# Patient Record
Sex: FEMALE | Race: WHITE
[De-identification: names, ages, dates, MRNs, and addresses within clinical notes are randomized per-mention and may not be internally consistent; named-entity substitution may affect disease eponyms.]

---

## 2018-02-10 NOTE — RADRPT
EXAM DATE/TIME:  02/10/2018 14:58 

 

[facility] COMPARISON:     

No previous studies available for comparison.

 

 

INDICATIONS :     

Headaches for two weeks.

                      

 

RADIATION DOSE:     

35.50 CTDIvol (mGy) 

 

 

 

MEDICAL HISTORY :     

None  

 

SURGICAL HISTORY :      

Tubal ligation. 

 

ENCOUNTER:      

Initial

 

ACUITY:      

2 weeks

 

PAIN SCALE:      

6/10

 

LOCATION:       

Bilateral cranial 

 

TECHNIQUE:     

Multiple contiguous axial images were obtained of the head.  Using automated exposure control and adj
ustment of the mA and/or kV according to patient size, radiation dose was kept as low as reasonably a
chievable to obtain optimal diagnostic quality images.   DICOM format image data is available electro
nically for review and comparison.  

 

FINDINGS:     

 

CEREBRUM:     

The ventricles are normal for age.  No evidence of midline shift, mass lesion, hemorrhage or acute in
farction.  No extra-axial fluid collections are seen.

 

POSTERIOR FOSSA:     

The cerebellum and brainstem are intact.  The 4th ventricle is midline.  The cerebellopontine angle i
s unremarkable.

 

EXTRACRANIAL:     

The visualized portion of the orbits is intact.

 

SKULL:     

The calvaria is intact.  No evidence of skull fracture.

 

CONCLUSION:     

Normal examination.  

 

 

 

 Roland Larios MD on February 10, 2018 at 15:05           

Board Certified Radiologist.

 This report was verified electronically.

## 2018-02-10 NOTE — PD
Data


Data


Last Documented VS





Vital Signs








  Date Time  Temp Pulse Resp B/P (MAP) Pulse Ox O2 Delivery O2 Flow Rate FiO2


 


2/10/18 16:12  87 20 120/70 (87) 98   


 


2/10/18 12:51 98.5     Room Air  








Orders





 Orders


Ct Brain W/O Iv Contrast(Rout) (2/10/18 )


Diphenhydramine (Benadryl) (2/10/18 13:45)


Prochlorperazine Inj (Compazine Inj) (2/10/18 13:45)


Ketorolac Inj (Toradol Inj) (2/10/18 16:00)








MDM


Supervised Visit with [facility]:  Yes


Narrative Course


The history, exam, and medical decision-making in the associated midlevel 

provider note were completed with my assistance. I reviewed and agree with the 

findings presented.  I attest that I had a face-to-face encounter with the 

patient on the same day, and personally performed and documented my assessment 

and findings in the medical record. 





*My assessment and Findings: This is a 37-year-old female who presents to the 

emergency department with headache that has been going on intermittently for 

over a week.  She has had headaches in the past and suffered with migraines 

when she was young.  This feels similar.  She has a normal gross neurologic 

exam.  Her headache started 6 hours prior to arrival in the ER and her CT scan 

is negative.  I doubt subarachnoid hemorrhage.  I think the patient can safely 

be discharged home.  She feels much better after a migraine cocktail.











Comfort Hernández MD Feb 10, 2018 16:13

## 2018-02-10 NOTE — PD
HPI


Chief Complaint:  Headache


Time Seen by Provider:  13:33


Travel History


International Travel<30 days:  No


Contact w/Intl Traveler<30days:  No


Traveled to known affect area:  No





History of Present Illness


HPI


37-year-old female presents emergency Department with complaint of a headache 

to the back of her head and neck that has been intermittent for the past 2 

weeks with worsening this morning at 7:30 AM.  Headache has been on and off and 

this morning it abruptly onset and has not been relieved with Goody powder.  

She went to work and developed cold sweats, nausea, blurred vision, 

lightheadedness and feeling like she was spinning.  Denies vomiting or fever.  

Denies focal deficits or weakness.  Denies confusion, disorientation, change in 

mentation, slurred speech.  Has history of migraines and last had a migraine 

headache 12 years ago.  Rates pain 6/10.  Describes it as a pressure.  Last 

menstrual period was last week and has history of tubal ligation.  Has tried 

Goody powder for symptom management.  No known relieving or aggravating 

factors.  No primary care provider.  Allergies to sulfa.  History of migraines, 

otherwise denies other significant past medical history.





PFSH


Past Medical History


Bipolar Disorder:  Yes


Tetanus Vaccination:  > 5 Years


Pregnant?:  Not Pregnant


LMP:  Tubal ligation





Social History


Alcohol Use:  No


Tobacco Use:  Yes


Substance Use:  No





Allergies-Medications


(Allergen,Severity, Reaction):  


Uncoded Allergies:  


     sulfa drugs (Allergy, Intermediate, 2/10/18)


Reported Meds & Prescriptions





Reported Meds & Active Scripts


Active


Reported


Geodon (Ziprasidone) 60 Mg Cap 60 Mg PO DAILY








Review of Systems


Except as stated in HPI:  all other systems reviewed are Neg





Physical Exam


Narrative


GENERAL: Well-nourished, well-developed  female patient, in no acute 

distress


SKIN: Warm and dry.


HEAD: Atraumatic. Normocephalic.  No facial droop noted.  Tongue midline.  

Finger to nose test normal.


EYES: Pupils equal and round at 4 mm with brisk reaction. No scleral icterus. 

No injection or drainage.  PERRLA.  EOMI.


ENT: Mucosa pink and moist.  Airway patent.


NECK: Trachea midline.  No lymphadenopathy.  


CARDIOVASCULAR: Regular rate and rhythm.  No murmur appreciated. 


RESPIRATORY: No accessory muscle use. Clear to auscultation. Breath sounds 

equal bilaterally. 


GASTROINTESTINAL: Abdomen soft, non-tender, nondistended. Hepatic and splenic 

margins not palpable.  Bowel sounds are active 4 quadrants.  


MUSCULOSKELETAL: No obvious deformities. No clubbing.  No cyanosis.  No edema. 


NEUROLOGICAL: Awake and alert.  Oriented 3.  No obvious cranial nerve 

deficits.  Motor grossly within normal limits. Normal speech. No ataxia.  No mid

-line drift.  No extremity or lower extremity drift.  Moves all extremities.  5/

5 strength to all extremities.


PSYCHIATRIC: Appropriate mood and affect; insight and judgment normal.





Data


Data


Last Documented VS





Vital Signs








  Date Time  Temp Pulse Resp B/P (MAP) Pulse Ox O2 Delivery O2 Flow Rate FiO2


 


2/10/18 16:12  87 20 120/70 (87) 98   


 


2/10/18 12:51 98.5     Room Air  








Orders





 Orders


Ct Brain W/O Iv Contrast(Rout) (2/10/18 )


Diphenhydramine (Benadryl) (2/10/18 13:45)


Prochlorperazine Inj (Compazine Inj) (2/10/18 13:45)


Ketorolac Inj (Toradol Inj) (2/10/18 16:00)








[facility]


Medical Decision Making


Medical Screen Exam Complete:  Yes


Emergency Medical Condition:  Yes


Medical Record Reviewed:  Yes


Differential Diagnosis


Cephalgia, migraine, subarachnoid hemorrhage, acute headache


Narrative Course


37-year-old female with posterior headache that abruptly onset this morning at 

approximate 7:30 AM.  Neuro exam is unremarkable.  She has had intermittent 

headache for the past 2 weeks.  Has history of migraines and last had a 

migraine headache 12 years ago.  CT head, Benadryl, Compazine ordered.


1548:  CT head concludes:  











Head CT 2/10/18 0000 Signed





Impressions: 





 Service Date/Time:  Saturday, February 10, 2018 14:58 - CONCLUSION:  Normal 





 examination.       Roland Larios MD 





Patient provided a copy of the CT report.  Toradol ordered.


1610:  Dr. Hernández evaluated the patient and agrees with patient discharge.  She 

denies headache at this time.  Ibuprofen and Zofran prescribed for home.  

Instructed patient to follow up with primary care provider.  Patient verbalizes 

understanding and agreement with treatment plan.  Patient is medically cleared 

and stable for discharge.  Discussed reasons to return to the emergency 

department.  Patient agrees with treatment plan.  The patients vital signs are 

stable and the patient is stable for outpatient follow-up and treatment.  

Patient discharged home, stable and in no acute distress.





Diagnosis





 Primary Impression:  


 Headache


 Qualified Codes:  R51 - Headache


Referrals:  


Primary Care Physician


Patient Instructions:  Acute Headache (ED), General Instructions, Migraine 

Headache (ED)





***Additional Instructions:  


Ibuprofen or Tylenol as directed and as needed to reduce headache


Get plenty of rest: do not over sleep


rest and relax in a dark, quiet room as needed


Place an ice pack on the back of her neck to reduce head pain as needed


Keep a headache diary of what triggers her headaches and what treatment is most 

effective


Avoid identifiable triggers


Avoid smoking, alcohol and caffeine consumption


Reduce stress


Follow-up with primary care provider within 1-2 days


Follow-up with neurology


Return immediately to the emergency department with worsening symptoms


***Med/Other Pt SpecificInfo:  Prescription(s) given


Scripts


Ibuprofen (Ibuprofen) 800 Mg Tab


800 MG PO Q6HR Y for PAIN, #30 TAB 0 Refills


   Prov: Domi Melara         2/10/18 


Ondansetron (Zofran) 4 Mg Tab


4 MG PO Q8HR Y for NAUSEA OR VOMITING, #6 TAB 0 Refills


   Prov: Domi Melara         2/10/18


Disposition:  01 DISCHARGE HOME


Condition:  Stable











Domi Melara Feb 10, 2018 13:44

## 2018-04-15 ENCOUNTER — HOSPITAL ENCOUNTER (EMERGENCY)
Dept: HOSPITAL 17 - NEPD | Age: 38
Discharge: HOME | End: 2018-04-15
Payer: MEDICAID

## 2018-04-15 VITALS
HEART RATE: 76 BPM | OXYGEN SATURATION: 99 % | DIASTOLIC BLOOD PRESSURE: 73 MMHG | SYSTOLIC BLOOD PRESSURE: 132 MMHG | RESPIRATION RATE: 18 BRPM | TEMPERATURE: 98 F

## 2018-04-15 VITALS — BODY MASS INDEX: 36.73 KG/M2 | HEIGHT: 65 IN | WEIGHT: 220.46 LBS

## 2018-04-15 DIAGNOSIS — Z72.0: ICD-10-CM

## 2018-04-15 DIAGNOSIS — J40: Primary | ICD-10-CM

## 2018-04-15 DIAGNOSIS — Z88.0: ICD-10-CM

## 2018-04-15 DIAGNOSIS — F31.9: ICD-10-CM

## 2018-04-15 DIAGNOSIS — Z79.899: ICD-10-CM

## 2018-04-15 DIAGNOSIS — Z88.2: ICD-10-CM

## 2018-04-15 PROCEDURE — 99283 EMERGENCY DEPT VISIT LOW MDM: CPT

## 2018-04-15 NOTE — PD
HPI


Chief Complaint:  Cold / Flu Symptoms


Time Seen by Provider:  12:26


Travel History


International Travel<30 days:  No


Contact w/Intl Traveler<30days:  No


Traveled to known affect area:  No





History of Present Illness


HPI


Patient 37-year-old female smoker presents emergency department with cough 

congestion not becoming some facial pain for the past few days.  Denies fever 

denies body aches.  States she has a history of bronchitis and is concerned for 

that.  Denies any hemoptysis denies any sputum production.  Symptoms moderate, 

gradually worsening, over the past few days, context and associated signs 

symptoms as above.  Denies any chest pain or shortness of breath abdominal pain 

nausea vomiting diarrhea constipation





PFSH


Past Medical History


Bipolar Disorder:  Yes


Pregnant?:  Not Pregnant


LMP:  APRIL 2018





Social History


Alcohol Use:  No


Tobacco Use:  Yes


Substance Use:  No





Allergies-Medications


(Allergen,Severity, Reaction):  


Coded Allergies:  


     Sulfa (Sulfonamide Antibiotics) (Unverified  Allergy, Mild, 4/15/18)


     amoxicillin (Unverified  Allergy, Mild, 4/15/18)


     clavulanic acid (Unverified  Allergy, Mild, 4/15/18)


Uncoded Allergies:  


     sulfa drugs (Allergy, Intermediate, 2/10/18)


Reported Meds & Prescriptions





Reported Meds & Active Scripts


Active


Proair Hfa 8.5 GM Inh (Albuterol Sulfate) 90 Mcg/Act Aer 1 Puff INH Q4H PRN


     108 mcg/actuation


Prednisone 20 Mg Tab 60 Mg PO DAILY 5 Days


Doxycycline Hyclate 100 Mg Cap 100 Mg PO BID 7 Days


Ibuprofen 800 Mg Tab 800 Mg PO Q6HR PRN


Zofran (Ondansetron HCl) 4 Mg Tab 4 Mg PO Q8HR PRN


Reported


Geodon (Ziprasidone) 60 Mg Cap 60 Mg PO DAILY








Review of Systems


Except as stated in HPI:  all other systems reviewed are Neg





Physical Exam


Narrative


GENERAL: Well-nourished, well-developed patient.  Morbidly obese


SKIN: Focused skin assessment warm/dry.


HEAD: Normocephalic.


EYES: No scleral icterus. No injection or drainage.


ENT: TMs clear bilaterally, oropharynx mildly erythematous but no edema, uvula 

midline, airway widely patent peer


NECK: Supple, trachea midline. No JVD or lymphadenopathy.


CARDIOVASCULAR: Regular rate and rhythm without murmurs, gallops, or rubs. 


RESPIRATORY: Breath sounds equal bilaterally. No accessory muscle use.  No 

wheezes rales or rhonchi, normal work of breathing peer


GASTROINTESTINAL: Abdomen soft, non-tender, nondistended. 


MUSCULOSKELETAL: No cyanosis, or edema. 


BACK: Nontender without obvious deformity. No CVA tenderness.





Data


Data


Last Documented VS





Vital Signs








  Date Time  Temp Pulse Resp B/P (MAP) Pulse Ox O2 Delivery O2 Flow Rate FiO2


 


4/15/18 12:34   24  98   


 


4/15/18 12:22 98.0 76  132/73 (92)    








Orders





 Orders


Ed Discharge Order (4/15/18 12:41)








MDM


Medical Decision Making


Medical Screen Exam Complete:  Yes


Emergency Medical Condition:  Yes


Differential Diagnosis


Bronchitis, pneumonia unlikely, URI, influenza unlikely.


Narrative Course


Patient room to the emergency department, lungs clear, plan is to cover the 

patient with empiric antibiotics for bronchitis anyways, no indication for 

chest x-ray.  Discussed symptomatic management follow-up with primary care 

physician.  Doxycycline chosen because patient has penicillin allergy and 

azithromycin can interact with her Geodon.  Discussed smoking cessation with 

the patient is smoking has many adverse health outcomes.





Diagnosis





 Primary Impression:  


 Bronchitis


***Med/Other Pt SpecificInfo:  Prescription(s) given


Scripts


Albuterol 8.5 GM Inh (Proair Hfa 8.5 GM Inh) 90 Mcg/Act Aer


1 PUFF INH Q4H Y for SHORTNESS OF BREATH, #1 INHALER 0 Refills


   108 mcg/actuation


   Prov: Donald Mary MD         4/15/18 


Prednisone (Prednisone) 20 Mg Tab


60 MG PO DAILY for 5 Days, #15 TAB 0 Refills


   Prov: Donald Mary MD         4/15/18 


Doxycycline Hyclate (Doxycycline Hyclate) 100 Mg Cap


100 MG PO BID for Infection for 7 Days, #14 CAP 0 Refills


   Prov: Donald Mary MD         4/15/18


Disposition:  01 DISCHARGE HOME


Condition:  Stable











Donald Mary MD Apr 15, 2018 12:41

## 2018-06-05 ENCOUNTER — HOSPITAL ENCOUNTER (EMERGENCY)
Dept: HOSPITAL 17 - NEPC | Age: 38
Discharge: HOME | End: 2018-06-05
Payer: MEDICAID

## 2018-06-05 VITALS
SYSTOLIC BLOOD PRESSURE: 123 MMHG | DIASTOLIC BLOOD PRESSURE: 71 MMHG | TEMPERATURE: 98.6 F | RESPIRATION RATE: 16 BRPM | HEART RATE: 68 BPM | OXYGEN SATURATION: 98 %

## 2018-06-05 DIAGNOSIS — R94.31: ICD-10-CM

## 2018-06-05 DIAGNOSIS — F17.210: ICD-10-CM

## 2018-06-05 DIAGNOSIS — M94.0: Primary | ICD-10-CM

## 2018-06-05 DIAGNOSIS — F31.9: ICD-10-CM

## 2018-06-05 LAB
BASOPHILS # BLD AUTO: 0.1 TH/MM3 (ref 0–0.2)
BASOPHILS NFR BLD: 0.8 % (ref 0–2)
BUN SERPL-MCNC: 12 MG/DL (ref 7–18)
CALCIUM SERPL-MCNC: 9.1 MG/DL (ref 8.5–10.1)
CHLORIDE SERPL-SCNC: 110 MEQ/L (ref 98–107)
CREAT SERPL-MCNC: 1.06 MG/DL (ref 0.5–1)
EOSINOPHIL # BLD: 0.2 TH/MM3 (ref 0–0.4)
EOSINOPHIL NFR BLD: 2.2 % (ref 0–4)
ERYTHROCYTE [DISTWIDTH] IN BLOOD BY AUTOMATED COUNT: 14.2 % (ref 11.6–17.2)
GFR SERPLBLD BASED ON 1.73 SQ M-ARVRAT: 59 ML/MIN (ref 89–?)
GLUCOSE SERPL-MCNC: 78 MG/DL (ref 74–106)
HCO3 BLD-SCNC: 20 MEQ/L (ref 21–32)
HCT VFR BLD CALC: 39.6 % (ref 35–46)
HGB BLD-MCNC: 13.4 GM/DL (ref 11.6–15.3)
LYMPHOCYTES # BLD AUTO: 2.7 TH/MM3 (ref 1–4.8)
LYMPHOCYTES NFR BLD AUTO: 29.8 % (ref 9–44)
MCH RBC QN AUTO: 29.8 PG (ref 27–34)
MCHC RBC AUTO-ENTMCNC: 33.8 % (ref 32–36)
MCV RBC AUTO: 88.1 FL (ref 80–100)
MONOCYTE #: 0.5 TH/MM3 (ref 0–0.9)
MONOCYTES NFR BLD: 5.2 % (ref 0–8)
NEUTROPHILS # BLD AUTO: 5.7 TH/MM3 (ref 1.8–7.7)
NEUTROPHILS NFR BLD AUTO: 62 % (ref 16–70)
PLATELET # BLD: 199 TH/MM3 (ref 150–450)
PMV BLD AUTO: 9.6 FL (ref 7–11)
RBC # BLD AUTO: 4.49 MIL/MM3 (ref 4–5.3)
SODIUM SERPL-SCNC: 141 MEQ/L (ref 136–145)
WBC # BLD AUTO: 9.1 TH/MM3 (ref 4–11)

## 2018-06-05 PROCEDURE — 85025 COMPLETE CBC W/AUTO DIFF WBC: CPT

## 2018-06-05 PROCEDURE — 71045 X-RAY EXAM CHEST 1 VIEW: CPT

## 2018-06-05 PROCEDURE — 99285 EMERGENCY DEPT VISIT HI MDM: CPT

## 2018-06-05 PROCEDURE — 93005 ELECTROCARDIOGRAM TRACING: CPT

## 2018-06-05 PROCEDURE — 96374 THER/PROPH/DIAG INJ IV PUSH: CPT

## 2018-06-05 PROCEDURE — 85379 FIBRIN DEGRADATION QUANT: CPT

## 2018-06-05 PROCEDURE — 80048 BASIC METABOLIC PNL TOTAL CA: CPT

## 2018-06-05 NOTE — RADRPT
EXAM DATE:  6/5/2018 4:49 PM EDT

AGE/SEX:        37 years / Female



INDICATIONS:  Pain left anterior chest and coughing for 2 weeks



CLINICAL DATA:  This is the patient's initial encounter. Patient reports that signs and symptoms have
 been present for 2 weeks and indicates a pain score of 7/10. 

                                                                          

MEDICAL/SURGICAL HISTORY:       . coughing smoker  None.



COMPARISON:      No prior [facility] exams available for comparison.



FINDINGS:  

A single AP view of the chest demonstrates the lungs to be symmetrically aerated without evidence of 
mass, infiltrate or effusion.  The cardiomediastinal contours are unremarkable.  Osseous structures a
re intact.  





CONCLUSION: 

Negative examination.



Electronically signed by: Reji Gomez MD  6/5/2018 4:54 PM EDT

## 2018-06-05 NOTE — PD
HPI


Chief Complaint:  Chest Pain


Time Seen by Provider:  16:14


Travel History


International Travel<30 days:  No


Contact w/Intl Traveler<30days:  No


Traveled to known affect area:  No





History of Present Illness


HPI


37-year-old female presents emergency department for evaluation of a substernal 

chest pain this is been ongoing for 2-3 weeks.  Is exacerbated by movement or 

deep inspiration.  Patient states that in March she had bronchitis.  Her cough 

never completely resolved.  She developed this pain and ibuprofen used to take 

care of it, however she feels it is no longer using the pain.  She denies any 

recent travel.  She has no history of PE or DVT.  She denies any fever or 

chills.  Patient does smoke 1 pack tobacco cigarettes daily.  She has no 

significant cardiac history.  No familial deaths prior to 50 due to cardiac 

disease.  Patient has no other symptoms to report at this time.





PFSH


Past Medical History


Medical History:  Denies Significant Hx


Bipolar Disorder:  Yes


Immunizations Current:  Yes


Pregnant?:  Not Pregnant


Tubal Ligation:  Yes





Past Surgical History


 Section:  Yes (x3)


Tonsillectomy:  Yes


Other Surgery:  Yes (leep)





Social History


Alcohol Use:  No


Tobacco Use:  Yes


Substance Use:  No





Allergies-Medications


(Allergen,Severity, Reaction):  


Coded Allergies:  


     Sulfa (Sulfonamide Antibiotics) (Unverified  Allergy, Mild, 4/15/18)


     amoxicillin (Unverified  Allergy, Mild, 4/15/18)


     clavulanic acid (Unverified  Allergy, Mild, 4/15/18)


Uncoded Allergies:  


     sulfa drugs (Allergy, Intermediate, 2/10/18)


Reported Meds & Prescriptions





Reported Meds & Active Scripts


Active


Medrol Dosepak (Methylprednisolone) 4 Mg Dspk 4 Mg PO AS DIRECTED


     Per Pharmacist direction


Reported


Geodon (Ziprasidone) 60 Mg Cap 60 Mg PO DAILY








Review of Systems


Except as stated in HPI:  all other systems reviewed are Neg





Physical Exam


Narrative


GENERAL: Well-nourished female patient in no acute distress


SKIN: Focused skin assessment warm/dry.


HEAD: Atraumatic. Normocephalic. 


EYES: Pupils equal and round. No scleral icterus. No injection or drainage. 


ENT: No nasal bleeding or discharge.  Mucous membranes pink and moist.


NECK: Trachea midline. No JVD. 


CARDIOVASCULAR: Regular rate and rhythm.  No murmur appreciated.


RESPIRATORY: No accessory muscle use.  Expiratory wheeze intermittently to 

auscultation. Breath sounds equal bilaterally.  Tenderness elicited palpation 

over the anterior cage near the sternum.  No crepitus.  Even respirations.


GASTROINTESTINAL: Abdomen soft, non-tender, nondistended. Hepatic and splenic 

margins not palpable. 


MUSCULOSKELETAL: No obvious deformities. No clubbing.  No cyanosis.  No edema. 


NEUROLOGICAL: Awake and alert. No obvious cranial nerve deficits.  Motor 

grossly within normal limits. Normal speech.


PSYCHIATRIC: Appropriate mood and affect; insight and judgment normal.





Data


Data


Last Documented VS





Vital Signs








  Date Time  Temp Pulse Resp B/P (MAP) Pulse Ox O2 Delivery O2 Flow Rate FiO2


 


18 16:18  79 18  94 Room Air  


 


18 16:04 98.6   123/71 (88)    








Orders





 Orders


Electrocardiogram (18 )


Iv Access Insert/Monitor (18 16:25)


Complete Blood Count With Diff (18 16:25)


Basic Metabolic Panel (Bmp) (18 16:25)


D-Dimer (18 16:25)


Chest, Single Ap (18 )


Ketorolac Inj (Toradol Inj) (18 16:30)


Sodium Chlor 0.9% 1000 Ml Inj (Ns 1000 M (18 16:30)


Ed Discharge Order (18 17:59)





Labs





Laboratory Tests








Test


  18


16:39


 


White Blood Count 9.1 TH/MM3 


 


Red Blood Count 4.49 MIL/MM3 


 


Hemoglobin 13.4 GM/DL 


 


Hematocrit 39.6 % 


 


Mean Corpuscular Volume 88.1 FL 


 


Mean Corpuscular Hemoglobin 29.8 PG 


 


Mean Corpuscular Hemoglobin


Concent 33.8 % 


 


 


Red Cell Distribution Width 14.2 % 


 


Platelet Count 199 TH/MM3 


 


Mean Platelet Volume 9.6 FL 


 


Neutrophils (%) (Auto) 62.0 % 


 


Lymphocytes (%) (Auto) 29.8 % 


 


Monocytes (%) (Auto) 5.2 % 


 


Eosinophils (%) (Auto) 2.2 % 


 


Basophils (%) (Auto) 0.8 % 


 


Neutrophils # (Auto) 5.7 TH/MM3 


 


Lymphocytes # (Auto) 2.7 TH/MM3 


 


Monocytes # (Auto) 0.5 TH/MM3 


 


Eosinophils # (Auto) 0.2 TH/MM3 


 


Basophils # (Auto) 0.1 TH/MM3 


 


CBC Comment DIFF FINAL 


 


Differential Comment  


 


D-Dimer Quantitative (PE/DVT) 0.28 MG/L FEU 


 


Blood Urea Nitrogen 12 MG/DL 


 


Creatinine 1.06 MG/DL 


 


Random Glucose 78 MG/DL 


 


Calcium Level 9.1 MG/DL 


 


Sodium Level 141 MEQ/L 


 


Potassium Level 4.0 MEQ/L 


 


Chloride Level 110 MEQ/L 


 


Carbon Dioxide Level 20.0 MEQ/L 


 


Anion Gap 11 MEQ/L 


 


Estimat Glomerular Filtration


Rate 59 ML/MIN 


 











[facility]


Medical Decision Making


Medical Screen Exam Complete:  Yes


Emergency Medical Condition:  Yes


Medical Record Reviewed:  Yes


Differential Diagnosis


Costochondritis versus pleurisy versus PE versus less likely ACS


Narrative Course


37-year-old female presents emergency department for evaluation of chest pain.  

Patient appears without distress.  This pain is musculoskeletal in origin.  

This is exacerbated by movement and deep inspiration.  Patient is given pain 

control.  I discussed the patient my attending physician.  Lab work and d-dimer 

are complete.





Laboratory Tests








Test


  18


16:39


 


White Blood Count 9.1 TH/MM3 


 


Red Blood Count 4.49 MIL/MM3 


 


Hemoglobin 13.4 GM/DL 


 


Hematocrit 39.6 % 


 


Mean Corpuscular Volume 88.1 FL 


 


Mean Corpuscular Hemoglobin 29.8 PG 


 


Mean Corpuscular Hemoglobin


Concent 33.8 % 


 


 


Red Cell Distribution Width 14.2 % 


 


Platelet Count 199 TH/MM3 


 


Mean Platelet Volume 9.6 FL 


 


Neutrophils (%) (Auto) 62.0 % 


 


Lymphocytes (%) (Auto) 29.8 % 


 


Monocytes (%) (Auto) 5.2 % 


 


Eosinophils (%) (Auto) 2.2 % 


 


Basophils (%) (Auto) 0.8 % 


 


Neutrophils # (Auto) 5.7 TH/MM3 


 


Lymphocytes # (Auto) 2.7 TH/MM3 


 


Monocytes # (Auto) 0.5 TH/MM3 


 


Eosinophils # (Auto) 0.2 TH/MM3 


 


Basophils # (Auto) 0.1 TH/MM3 


 


CBC Comment DIFF FINAL 


 


Differential Comment  


 


D-Dimer Quantitative (PE/DVT) 0.28 MG/L FEU 


 


Blood Urea Nitrogen 12 MG/DL 


 


Creatinine 1.06 MG/DL 


 


Random Glucose 78 MG/DL 


 


Calcium Level 9.1 MG/DL 


 


Sodium Level 141 MEQ/L 


 


Potassium Level 4.0 MEQ/L 


 


Chloride Level 110 MEQ/L 


 


Carbon Dioxide Level 20.0 MEQ/L 


 


Anion Gap 11 MEQ/L 


 


Estimat Glomerular Filtration


Rate 59 ML/MIN 


 








Last Impressions








Chest X-Ray 18 0000 Signed





Impressions: 





 CONCLUSION: 





 Negative examination.





  





 





Findings are reviewed and discussed with my attending physician.  She is also 

assessed the patient. patient will be discharged home at this time.  She is 

encouraged to follow-up with a primary care provider and return immediately 

with acute worsening symptoms.





Diagnosis





 Primary Impression:  


 Costochondritis


Referrals:  


Primary Care Physician


Patient Instructions:  Costochondritis (ED), General Instructions





***Additional Instructions:  


Follow-up with a primary care provider


Humidified air may help to alleviate symptoms


Stop smoking tobacco cigarettes


Return immediately with acute worsening of symptoms


***Med/Other Pt SpecificInfo:  Prescription(s) given


Scripts


Methylprednisolone Dosepak (Medrol Dosepak) 4 Mg Dspk


4 MG PO AS DIRECTED, #1 DSPK 0 Refills


   Per Pharmacist direction


   Prov: Cristina Monge         18


Disposition:  01 DISCHARGE HOME


Condition:  Stable











Cristina Monge 2018 16:29

## 2018-06-06 NOTE — EKG
Date Performed: 06/05/2018       Time Performed: 16:24:59

 

PTAGE:      37 years

 

EKG:      Sinus rhythm 

 

 WITH MARKED SINUS ARRHYTHMIA BORDERLINE ECG 

 

NO PREVIOUS TRACING            

 

DOCTOR:   Charisma Fish  Interpretating Date/Time  06/06/2018 19:15:07